# Patient Record
Sex: FEMALE | Race: OTHER
[De-identification: names, ages, dates, MRNs, and addresses within clinical notes are randomized per-mention and may not be internally consistent; named-entity substitution may affect disease eponyms.]

---

## 2018-06-25 PROBLEM — Z00.00 ENCOUNTER FOR PREVENTIVE HEALTH EXAMINATION: Status: ACTIVE | Noted: 2018-06-25

## 2018-06-29 ENCOUNTER — APPOINTMENT (OUTPATIENT)
Dept: ANTEPARTUM | Facility: CLINIC | Age: 33
End: 2018-06-29
Payer: COMMERCIAL

## 2018-06-29 ENCOUNTER — ASOB RESULT (OUTPATIENT)
Age: 33
End: 2018-06-29

## 2018-06-29 PROCEDURE — 76813 OB US NUCHAL MEAS 1 GEST: CPT

## 2018-06-29 PROCEDURE — 36416 COLLJ CAPILLARY BLOOD SPEC: CPT

## 2018-06-29 PROCEDURE — 76801 OB US < 14 WKS SINGLE FETUS: CPT

## 2018-07-16 ENCOUNTER — TRANSCRIPTION ENCOUNTER (OUTPATIENT)
Age: 33
End: 2018-07-16

## 2018-08-22 ENCOUNTER — OUTPATIENT (OUTPATIENT)
Dept: OUTPATIENT SERVICES | Facility: HOSPITAL | Age: 33
LOS: 1 days | End: 2018-08-22
Payer: COMMERCIAL

## 2018-08-22 DIAGNOSIS — O26.899 OTHER SPECIFIED PREGNANCY RELATED CONDITIONS, UNSPECIFIED TRIMESTER: ICD-10-CM

## 2018-08-22 DIAGNOSIS — Z3A.00 WEEKS OF GESTATION OF PREGNANCY NOT SPECIFIED: ICD-10-CM

## 2018-08-22 LAB
BASOPHILS # BLD AUTO: 0.02 K/UL — SIGNIFICANT CHANGE UP (ref 0–0.2)
BASOPHILS NFR BLD AUTO: 0.3 % — SIGNIFICANT CHANGE UP (ref 0–2)
BUN SERPL-MCNC: 10 MG/DL — SIGNIFICANT CHANGE UP (ref 7–23)
CALCIUM SERPL-MCNC: 9 MG/DL — SIGNIFICANT CHANGE UP (ref 8.4–10.5)
CHLORIDE SERPL-SCNC: 102 MMOL/L — SIGNIFICANT CHANGE UP (ref 98–107)
CO2 SERPL-SCNC: 22 MMOL/L — SIGNIFICANT CHANGE UP (ref 22–31)
CREAT SERPL-MCNC: 0.58 MG/DL — SIGNIFICANT CHANGE UP (ref 0.5–1.3)
EOSINOPHIL # BLD AUTO: 0.04 K/UL — SIGNIFICANT CHANGE UP (ref 0–0.5)
EOSINOPHIL NFR BLD AUTO: 0.6 % — SIGNIFICANT CHANGE UP (ref 0–6)
GLUCOSE BLDC GLUCOMTR-MCNC: 96 MG/DL — SIGNIFICANT CHANGE UP (ref 70–99)
GLUCOSE SERPL-MCNC: 87 MG/DL — SIGNIFICANT CHANGE UP (ref 70–99)
HCT VFR BLD CALC: 34.2 % — LOW (ref 34.5–45)
HGB BLD-MCNC: 11.8 G/DL — SIGNIFICANT CHANGE UP (ref 11.5–15.5)
IMM GRANULOCYTES # BLD AUTO: 0.04 # — SIGNIFICANT CHANGE UP
IMM GRANULOCYTES NFR BLD AUTO: 0.6 % — SIGNIFICANT CHANGE UP (ref 0–1.5)
LYMPHOCYTES # BLD AUTO: 1.19 K/UL — SIGNIFICANT CHANGE UP (ref 1–3.3)
LYMPHOCYTES # BLD AUTO: 16.4 % — SIGNIFICANT CHANGE UP (ref 13–44)
MCHC RBC-ENTMCNC: 31.2 PG — SIGNIFICANT CHANGE UP (ref 27–34)
MCHC RBC-ENTMCNC: 34.5 % — SIGNIFICANT CHANGE UP (ref 32–36)
MCV RBC AUTO: 90.5 FL — SIGNIFICANT CHANGE UP (ref 80–100)
MONOCYTES # BLD AUTO: 0.59 K/UL — SIGNIFICANT CHANGE UP (ref 0–0.9)
MONOCYTES NFR BLD AUTO: 8.1 % — SIGNIFICANT CHANGE UP (ref 2–14)
NEUTROPHILS # BLD AUTO: 5.37 K/UL — SIGNIFICANT CHANGE UP (ref 1.8–7.4)
NEUTROPHILS NFR BLD AUTO: 74 % — SIGNIFICANT CHANGE UP (ref 43–77)
NRBC # FLD: 0 — SIGNIFICANT CHANGE UP
PLATELET # BLD AUTO: 190 K/UL — SIGNIFICANT CHANGE UP (ref 150–400)
PMV BLD: 9.2 FL — SIGNIFICANT CHANGE UP (ref 7–13)
POTASSIUM SERPL-MCNC: 4.2 MMOL/L — SIGNIFICANT CHANGE UP (ref 3.5–5.3)
POTASSIUM SERPL-SCNC: 4.2 MMOL/L — SIGNIFICANT CHANGE UP (ref 3.5–5.3)
RBC # BLD: 3.78 M/UL — LOW (ref 3.8–5.2)
RBC # FLD: 12.4 % — SIGNIFICANT CHANGE UP (ref 10.3–14.5)
SODIUM SERPL-SCNC: 135 MMOL/L — SIGNIFICANT CHANGE UP (ref 135–145)
WBC # BLD: 7.25 K/UL — SIGNIFICANT CHANGE UP (ref 3.8–10.5)
WBC # FLD AUTO: 7.25 K/UL — SIGNIFICANT CHANGE UP (ref 3.8–10.5)

## 2018-08-22 PROCEDURE — 93010 ELECTROCARDIOGRAM REPORT: CPT

## 2018-08-24 ENCOUNTER — APPOINTMENT (OUTPATIENT)
Dept: ANTEPARTUM | Facility: CLINIC | Age: 33
End: 2018-08-24
Payer: COMMERCIAL

## 2018-08-24 ENCOUNTER — ASOB RESULT (OUTPATIENT)
Age: 33
End: 2018-08-24

## 2018-08-24 PROCEDURE — 76811 OB US DETAILED SNGL FETUS: CPT

## 2018-11-27 ENCOUNTER — ASOB RESULT (OUTPATIENT)
Age: 33
End: 2018-11-27

## 2018-11-27 ENCOUNTER — APPOINTMENT (OUTPATIENT)
Dept: ANTEPARTUM | Facility: CLINIC | Age: 33
End: 2018-11-27
Payer: COMMERCIAL

## 2018-11-27 ENCOUNTER — APPOINTMENT (OUTPATIENT)
Dept: ANTEPARTUM | Facility: HOSPITAL | Age: 33
End: 2018-11-27

## 2018-11-27 PROCEDURE — 76816 OB US FOLLOW-UP PER FETUS: CPT

## 2018-11-27 PROCEDURE — 76818 FETAL BIOPHYS PROFILE W/NST: CPT | Mod: 26

## 2018-11-27 PROCEDURE — 76820 UMBILICAL ARTERY ECHO: CPT

## 2018-12-07 ENCOUNTER — APPOINTMENT (OUTPATIENT)
Dept: ANTEPARTUM | Facility: CLINIC | Age: 33
End: 2018-12-07
Payer: COMMERCIAL

## 2018-12-07 ENCOUNTER — ASOB RESULT (OUTPATIENT)
Age: 33
End: 2018-12-07

## 2018-12-07 ENCOUNTER — EMERGENCY (EMERGENCY)
Facility: HOSPITAL | Age: 33
LOS: 1 days | Discharge: HOME | End: 2018-12-07
Attending: EMERGENCY MEDICINE | Admitting: EMERGENCY MEDICINE
Payer: COMMERCIAL

## 2018-12-07 ENCOUNTER — APPOINTMENT (OUTPATIENT)
Dept: ANTEPARTUM | Facility: HOSPITAL | Age: 33
End: 2018-12-07

## 2018-12-07 VITALS
SYSTOLIC BLOOD PRESSURE: 114 MMHG | HEART RATE: 74 BPM | OXYGEN SATURATION: 100 % | TEMPERATURE: 97 F | RESPIRATION RATE: 16 BRPM | DIASTOLIC BLOOD PRESSURE: 77 MMHG

## 2018-12-07 LAB
ALBUMIN SERPL ELPH-MCNC: 3.4 G/DL — SIGNIFICANT CHANGE UP (ref 3.3–5)
ALP SERPL-CCNC: 117 U/L — SIGNIFICANT CHANGE UP (ref 40–120)
ALT FLD-CCNC: 12 U/L — SIGNIFICANT CHANGE UP (ref 4–33)
AST SERPL-CCNC: 17 U/L — SIGNIFICANT CHANGE UP (ref 4–32)
BASE EXCESS BLDV CALC-SCNC: -1.9 MMOL/L — SIGNIFICANT CHANGE UP
BASOPHILS # BLD AUTO: 0.01 K/UL — SIGNIFICANT CHANGE UP (ref 0–0.2)
BASOPHILS NFR BLD AUTO: 0.1 % — SIGNIFICANT CHANGE UP (ref 0–2)
BILIRUB SERPL-MCNC: < 0.2 MG/DL — LOW (ref 0.2–1.2)
BLOOD GAS VENOUS - CREATININE: 0.46 MG/DL — LOW (ref 0.5–1.3)
BUN SERPL-MCNC: 7 MG/DL — SIGNIFICANT CHANGE UP (ref 7–23)
CALCIUM SERPL-MCNC: 9.1 MG/DL — SIGNIFICANT CHANGE UP (ref 8.4–10.5)
CHLORIDE BLDV-SCNC: 109 MMOL/L — HIGH (ref 96–108)
CHLORIDE SERPL-SCNC: 104 MMOL/L — SIGNIFICANT CHANGE UP (ref 98–107)
CO2 SERPL-SCNC: 23 MMOL/L — SIGNIFICANT CHANGE UP (ref 22–31)
CREAT SERPL-MCNC: 0.58 MG/DL — SIGNIFICANT CHANGE UP (ref 0.5–1.3)
EOSINOPHIL # BLD AUTO: 0.05 K/UL — SIGNIFICANT CHANGE UP (ref 0–0.5)
EOSINOPHIL NFR BLD AUTO: 0.6 % — SIGNIFICANT CHANGE UP (ref 0–6)
GAS PNL BLDV: 135 MMOL/L — LOW (ref 136–146)
GLUCOSE BLDV-MCNC: 100 — HIGH (ref 70–99)
GLUCOSE SERPL-MCNC: 94 MG/DL — SIGNIFICANT CHANGE UP (ref 70–99)
HCG SERPL-ACNC: SIGNIFICANT CHANGE UP MIU/ML
HCO3 BLDV-SCNC: 22 MMOL/L — SIGNIFICANT CHANGE UP (ref 20–27)
HCT VFR BLD CALC: 36 % — SIGNIFICANT CHANGE UP (ref 34.5–45)
HCT VFR BLDV CALC: 39.2 % — SIGNIFICANT CHANGE UP (ref 34.5–45)
HGB BLD-MCNC: 12.7 G/DL — SIGNIFICANT CHANGE UP (ref 11.5–15.5)
HGB BLDV-MCNC: 12.7 G/DL — SIGNIFICANT CHANGE UP (ref 11.5–15.5)
IMM GRANULOCYTES # BLD AUTO: 0.02 # — SIGNIFICANT CHANGE UP
IMM GRANULOCYTES NFR BLD AUTO: 0.3 % — SIGNIFICANT CHANGE UP (ref 0–1.5)
LACTATE BLDV-MCNC: 1.4 MMOL/L — SIGNIFICANT CHANGE UP (ref 0.5–2)
LYMPHOCYTES # BLD AUTO: 1.18 K/UL — SIGNIFICANT CHANGE UP (ref 1–3.3)
LYMPHOCYTES # BLD AUTO: 15.1 % — SIGNIFICANT CHANGE UP (ref 13–44)
MCHC RBC-ENTMCNC: 32.2 PG — SIGNIFICANT CHANGE UP (ref 27–34)
MCHC RBC-ENTMCNC: 35.3 % — SIGNIFICANT CHANGE UP (ref 32–36)
MCV RBC AUTO: 91.4 FL — SIGNIFICANT CHANGE UP (ref 80–100)
MONOCYTES # BLD AUTO: 0.49 K/UL — SIGNIFICANT CHANGE UP (ref 0–0.9)
MONOCYTES NFR BLD AUTO: 6.3 % — SIGNIFICANT CHANGE UP (ref 2–14)
NEUTROPHILS # BLD AUTO: 6.04 K/UL — SIGNIFICANT CHANGE UP (ref 1.8–7.4)
NEUTROPHILS NFR BLD AUTO: 77.6 % — HIGH (ref 43–77)
NRBC # FLD: 0 — SIGNIFICANT CHANGE UP
NT-PROBNP SERPL-SCNC: 56.05 PG/ML — SIGNIFICANT CHANGE UP
PCO2 BLDV: 38 MMHG — LOW (ref 41–51)
PH BLDV: 7.39 PH — SIGNIFICANT CHANGE UP (ref 7.32–7.43)
PLATELET # BLD AUTO: 220 K/UL — SIGNIFICANT CHANGE UP (ref 150–400)
PMV BLD: 9.5 FL — SIGNIFICANT CHANGE UP (ref 7–13)
PO2 BLDV: 42 MMHG — HIGH (ref 35–40)
POTASSIUM BLDV-SCNC: 3.7 MMOL/L — SIGNIFICANT CHANGE UP (ref 3.4–4.5)
POTASSIUM SERPL-MCNC: 4.2 MMOL/L — SIGNIFICANT CHANGE UP (ref 3.5–5.3)
POTASSIUM SERPL-SCNC: 4.2 MMOL/L — SIGNIFICANT CHANGE UP (ref 3.5–5.3)
PROT SERPL-MCNC: 6.7 G/DL — SIGNIFICANT CHANGE UP (ref 6–8.3)
RBC # BLD: 3.94 M/UL — SIGNIFICANT CHANGE UP (ref 3.8–5.2)
RBC # FLD: 12.9 % — SIGNIFICANT CHANGE UP (ref 10.3–14.5)
SAO2 % BLDV: 72.3 % — SIGNIFICANT CHANGE UP (ref 60–85)
SODIUM SERPL-SCNC: 138 MMOL/L — SIGNIFICANT CHANGE UP (ref 135–145)
TROPONIN T, HIGH SENSITIVITY: < 6 NG/L — SIGNIFICANT CHANGE UP (ref ?–14)
TSH SERPL-MCNC: 1.5 UIU/ML — SIGNIFICANT CHANGE UP (ref 0.27–4.2)
WBC # BLD: 7.79 K/UL — SIGNIFICANT CHANGE UP (ref 3.8–10.5)
WBC # FLD AUTO: 7.79 K/UL — SIGNIFICANT CHANGE UP (ref 3.8–10.5)

## 2018-12-07 PROCEDURE — 76818 FETAL BIOPHYS PROFILE W/NST: CPT | Mod: 26

## 2018-12-07 PROCEDURE — 93970 EXTREMITY STUDY: CPT | Mod: 26

## 2018-12-07 PROCEDURE — 93010 ELECTROCARDIOGRAM REPORT: CPT | Mod: 59

## 2018-12-07 PROCEDURE — 71045 X-RAY EXAM CHEST 1 VIEW: CPT | Mod: 26

## 2018-12-07 PROCEDURE — 76820 UMBILICAL ARTERY ECHO: CPT

## 2018-12-07 PROCEDURE — 99218: CPT | Mod: 25

## 2018-12-07 PROCEDURE — 71046 X-RAY EXAM CHEST 2 VIEWS: CPT | Mod: 26

## 2018-12-07 NOTE — ED CDU PROVIDER INITIAL DAY NOTE - PHYSICAL EXAMINATION
Vital signs reviewed.   CONSTITUTIONAL: Well-appearing; well-nourished; in no apparent distress. Non-toxic appearing.   HEAD: Normocephalic, atraumatic.  EYES: PERRL, EOM intact, conjunctiva and sclera WNL.  ENT: normal nose; no rhinorrhea;   NECK: Trachea midline, no midline tenderness noted.   CARD: Normal S1, S2; no murmurs, rubs, or gallops noted.  RESP: Normal chest excursion with respiration; breath sounds clear and equal bilaterally; no wheezes, rhonchi, or rales.  ABD/GI: soft, non-distended; non-tender  EXT/MS: moves all extremities; distal pulses are normal, no pedal edema. No midline tenderness.   SKIN: Normal for age and race; warm; dry; good turgor; no apparent lesions or exudate noted.  NEURO: Awake, alert, oriented x 3, no gross deficits  PSYCH: Normal mood; appropriate affect.

## 2018-12-07 NOTE — ED ADULT NURSE REASSESSMENT NOTE - NS ED NURSE REASSESS COMMENT FT1
Patient is awake, A&O x 3, NAD, presents to ED for increased LUCERO and chest pressure.  Cleared by OB and sent to ED for Eval.  Patient admitted to CDU for eval.  report given and will take patient to CDU.

## 2018-12-07 NOTE — ED PROVIDER NOTE - ATTENDING CONTRIBUTION TO CARE
ED Attending (Hao JONES): I have personally performed a face to face bedside history and physical examination of this patient. I have discussed the history, examination, assessment and plan of management with the Physician Assistant. My findings include: 32 y/o female  36 weeks pregnant c/o sob. Pt admits to feeling sob throughout her entire pregnancy. Pt admits that over the last 2-3 weeks the sob has increased and is now LUCERO. Pt admits to having to stop after walking 1 flight of stairs to catch her breath. Pt also admits to chest discomfort with exertion 2x over the past week. Pt admits to palpitations associated with the sob. Pt went to her normal OBGYN appointment today who then sent pt to the ER for eval. Pt denies diaphoresis, abd pain, n/v/d, vaginal bleeding, numbness, tingling, weakness, dizziness, syncope, fever or chills. Denies recent travel or sick contacts. Denies cough, sore throat or rhinorrhea. On exam: VSS in no distress, obese, lungs clear heart sounds normal, pulses normal, abdo obese, uterus c/w 36 week gestation, non tender, neuro normal, ext bilat edema, IMP: Worsening SOB LUCERO in 36 week pregnant female. Plan: Labs EKG, CXR dopplers, consider CTPA, will d/w OB.

## 2018-12-07 NOTE — ED ADULT NURSE REASSESSMENT NOTE - NS ED NURSE REASSESS COMMENT FT1
Break relief RN: pt returned from U/S   Pt alert and oriented x4, speaking full sentences no SOB noted   Pt states shes 36 week pregnant EDC 1/4 + fetal movement denies pain   awaiting dispo

## 2018-12-07 NOTE — ED CDU PROVIDER INITIAL DAY NOTE - OBJECTIVE STATEMENT
HPI: Pt is a 33 y.o female  currently 36 weeks pregnant w/ PMHx "heart murmur as child' who presents to ED sent by OB/GYN for c/o sob. As per patient states that she has felt sob throughout her entire pregnancy but over past 2 weeks sob has increased, admitting to feeling sob when walking maybe 20-30 ft. Also admits she feels she needs to catch her breath with ambulating up stairs. Occasionally gets CP when experiencing sob. Pt states she goes to routine NST weekly and while having NST today she noted to RN she felt sob, so OB recommended she go to ER. Pt also notes 60 lb weight gain with pregnancy and feeling tired. Denies n/v/d, abdominal pain, neck pain, back pain, pleuritic pain, palpitations, weakness, numbness or tingling, vaginal bleeding, discharge, dysuria, fevers, chills, trauma/falls or any other complaints. Denies pregnancy related sx.     OB/GYN- Dr. Swann  +Former smoker

## 2018-12-07 NOTE — ED CDU PROVIDER INITIAL DAY NOTE - ATTENDING CONTRIBUTION TO CARE
ED Attending (Hao JONES): I have personally performed a face to face bedside history and physical examination of this patient. I have discussed the history, examination, assessment and plan of management with the Physician Assistant. My findings include: 34 y/o female  36 weeks pregnant c/o sob. Pt admits to feeling sob throughout her entire pregnancy. Pt admits that over the last 2-3 weeks the sob has increased and is now LUCERO. Pt admits to having to stop after walking 1 flight of stairs to catch her breath. Pt also admits to chest discomfort with exertion 2x over the past week. Pt admits to palpitations associated with the sob. Pt went to her normal OBGYN appointment today who then sent pt to the ER for eval. Pt denies diaphoresis, abd pain, n/v/d, vaginal bleeding, numbness, tingling, weakness, dizziness, syncope, fever or chills. Denies recent travel or sick contacts. Denies cough, sore throat or rhinorrhea. On exam: VSS in no distress, obese, lungs clear heart sounds normal, pulses normal, abdo obese, uterus c/w 36 week gestation, non tender, neuro normal, ext bilat edema, IMP: Worsening SOB LUCERO in 36 week pregnant female. In ED case d/w OB (resident and L&D [id number]) pt had labs, EKG CXR duplex, all unremarkable. Pt refuses CTPA. Sent to CDU for monitoring and ECHO in AM

## 2018-12-07 NOTE — ED PROVIDER NOTE - PROGRESS NOTE DETAILS
Discussed with OB ext [id number] and OB resident. Patient initially refused CXR subsequently agreed CXR neg, labs unremarkable and Duplex LE neg. Pt still declines CTPA. Will admit to CDU for monitoring and Echo in AM

## 2018-12-07 NOTE — ED CDU PROVIDER INITIAL DAY NOTE - DETAILS
Pt is a 33 y.o female  w/ PMhx of "heart murmur as child" who presents to ED c/o sob. Pt seen and worked up in ED, trop neg, ECG stable, CXR normal, BNP normal, OB aware and will perform NST. Pt was transferred to CDU for; Echo, tele, vitals q4, frequent re-evals.

## 2018-12-07 NOTE — ED PROVIDER NOTE - OBJECTIVE STATEMENT
34 y/o female  36 weeks pregnant c/o sob. Pt admits to feeling sob throughout her entire pregnancy. Pt admits that over the last 2-3 weeks the sob has increased and is now LUCERO. Pt admits to having to stop after walking 1 flight of stairs to catch her breath. Pt also admits to chest discomfort with exertion 2x over the past week. Pt admits to palpitations associated with the sob. Pt went to her normal GYN appointment today who then sent pt to the ER for eval. Pt denies diaphoresis, abd pain, n/v/d, vaginal bleeding, numbness, tingling, weakness, dizziness, syncope, fever or chills. Denies recent travel or sick contacts. Denies cough, sore throat or rhinorrhea.

## 2018-12-07 NOTE — ED ADULT NURSE NOTE - OBJECTIVE STATEMENT
33 y female A+Ox3, 36 weeks pregnant, presents to the ER with the complaint of SOB. Pt states SOB has been occuring since the start of the pregnancy. Was here for a routine visit upstairs and told to come down to get checked. Pt states SOB starts when she ambulates for a while. Able to lay down flat. Denies any fever, chills, cough. Denies any PMH. 20 g iv placed on right ac, labs drawn and sent. Will continue to monitor.

## 2018-12-07 NOTE — ED ADULT TRIAGE NOTE - CHIEF COMPLAINT QUOTE
pt amb to triage from OB c/o SOB associated w/ palpitations x past few weeks, worse recently, 36 weeks preg, ÁNGEL 1/4/19, able to complete sentences, no cough noted on presentation

## 2018-12-08 VITALS
SYSTOLIC BLOOD PRESSURE: 107 MMHG | TEMPERATURE: 98 F | HEART RATE: 66 BPM | RESPIRATION RATE: 16 BRPM | DIASTOLIC BLOOD PRESSURE: 78 MMHG | OXYGEN SATURATION: 100 %

## 2018-12-08 PROCEDURE — 99217: CPT

## 2018-12-08 PROCEDURE — 93306 TTE W/DOPPLER COMPLETE: CPT | Mod: 26

## 2018-12-08 NOTE — ED CDU PROVIDER SUBSEQUENT DAY NOTE - NS ED MD PROGRESS NOTE ADD
Add Progress Note...
I have personally performed a face to face diagnostic evaluation on this patient. I have reviewed the ACP note and agree with the history, exam and plan of care, except as noted.

## 2018-12-08 NOTE — CONSULT NOTE ADULT - ASSESSMENT
A/P: 33y  at 36w1d presenting w increasing LUCERO  - CXR prelim wnl, f/u final read  - LE dopplers wnl  - pt for ECHO in AM  - no acute OB interventions at this time, NST/BPP reactive and   - will continue to follow  - appreciate ED work-up    D/w Dr. Rehan Corrales, PGY2

## 2018-12-08 NOTE — CONSULT NOTE ADULT - SUBJECTIVE AND OBJECTIVE BOX
R2 GYN CONSULT NOTE    33y  Last Menstrual Period mid-March, ÁNGEL=18 by first trimester ultrasound, at 36w1d presenting to the ED w worsening SOB in the past 2 weeks.  Pt states that in the past two weeks, has been increasingly harder to walk up stairs, and now can only walk up 1 flight before needing to rest.  States has sharp chest pain when breathing fast after walking.  Pt states has had LE swelling for the past 2 months.  Pt denies calf pain, denies fevers, chills, nausea, vomiting, urinary problems.    Denies contractions, vaginal bleeding, leakage of fluids, endorses good fetal movement.    PNC: uncomplicated thus far    OB/GYN HISTORY: 2008 D&C for eTOP at 4wks    GynHx: denies fibroids, cysts, STIs, abnl pap smears    Surgical History:    No significant past surgical history    Past Medical History:   No pertinent past medical history    Social: went through a "smoking phase" 2 years ago, lasted 1 year, had 1-2 cigarettes per day, no longer smokes, denies EtOH, drug use    Meds: PNV, DHA    Allergies: No Known Allergies    REVIEW OF SYSTEMS: see HPI, otherwise neg    OBJECTIVE FINDINGS:    Vital Signs Last 24 Hrs  T(C): 36.5 (08 Dec 2018 03:17), Max: 36.6 (07 Dec 2018 19:22)  T(F): 97.7 (08 Dec 2018 03:17), Max: 97.9 (07 Dec 2018 23:20)  HR: 73 (08 Dec 2018 03:17) (63 - 77)  BP: 103/62 (08 Dec 2018 03:17) (103/62 - 131/69)  BP(mean): 131 (07 Dec 2018 23:20) (131 - 131)  RR: 16 (08 Dec 2018 03:17) (15 - 17)  SpO2: 99% (08 Dec 2018 03:17) (99% - 100%)        PE:  Gen: Comfortable, NAD  CV: RRR  Pulm: CTAB  Abd: Soft, NT, gravid  Ext: Mild +1 edema non pitting, no tenderness or erythema bilaterally    LABS:                        12.7   7.79  )-----------( 220      ( 07 Dec 2018 17:17 )             36.0     12-07    138  |  104  |  7   ----------------------------<  94  4.2   |  23  |  0.58    Ca    9.1      07 Dec 2018 17:17    TPro  6.7  /  Alb  3.4  /  TBili  < 0.2<L>  /  DBili  x   /  AST  17  /  ALT  12  /  AlkPhos  117          RADIOLOGY & ADDITIONAL STUDIES:  < from: Xray Chest 1 View AP/PA (18 @ 21:04) >    ******PRELIMINARY REPORT******          EXAM:  XR CHEST AP OR PA 1V      PROCEDURE DATE:  Dec  7 2018     ******PRELIMINARY REPORT******          INTERPRETATION:  Clear Lungs    ******PRELIMINARY REPORT******          ARCHIE ADNIELS M.D., RADIOLOGY RESIDENT    < end of copied text >    < from: US Duplex Venous Lower Ext Complete, Bilateral (18 @ 18:54) >    EXAM:  US DPLX LWR EXT VEINS COMPL BI      PROCEDURE DATE:  Dec  7 2018     INTERPRETATION:  CLINICAL INFORMATION: Leg swelling. Pregnancy.    COMPARISON: None available.    TECHNIQUE: Duplex sonography of the BILATERAL LOWER extremities with  color and spectral Doppler, with and without compression.      FINDINGS:    There is normal compressibility of the bilateral common femoral, femoral   and popliteal veins. No calf vein thrombosis is detected.    Doppler examination shows normal spontaneous and phasic flow.    IMPRESSION:     No evidence of bilateral lower extremity deep venous thrombosis.    RONEY ROBINS M.D., RADIOLOGY RESIDENT  This document has been electronically signed.  KATRINA RHOADES M.D., ATTENDING RADIOLOGIST  This document has been electronically signed. Dec  7 2018  7:29PM      < end of copied text >

## 2018-12-08 NOTE — ED CDU PROVIDER SUBSEQUENT DAY NOTE - HISTORY
See CDU Initial note for full HPI: Pt is a 33 y.o female   36 weeks pregnant w/ PMhx of "heart murmur as child" who presents to ED c/o sob. Pt admits to sob throughout course of pregnancy but has been worse past 2 weeks. Pt had NST outpatient and mentioned SOB and her OB sent her to ED. Pt denies ob related complaints.  Pt seen and worked up in ED, trop neg, ECG stable, CXR normal, BNP normal, OB consulted, NST performed and normal. Pt was transferred to CDU for; Echo, tele, vitals q4, frequent re-evals, OB following.

## 2018-12-08 NOTE — ED CDU PROVIDER DISPOSITION NOTE - ATTENDING CONTRIBUTION TO CARE
I performed a face-to-face evaluation of the patient and performed a history and physical examination. I agree with the history and physical examination.    Avelina: 36 wks. preg., SOB. OB NST unremarkable. EKG and CXR unremarkable. BNP unremarkable. Doppler legs unremarkable. Echo performed. Pt. wants to leave. Stable for discharge. Will call w/ echo results: [phone].

## 2018-12-08 NOTE — ED CDU PROVIDER SUBSEQUENT DAY NOTE - PHYSICAL EXAMINATION
Vital signs reviewed.   CONSTITUTIONAL: Well-appearing; well-nourished; in no apparent distress. Non-toxic appearing.   HEAD: Normocephalic, atraumatic.  ENT: normal nose; no rhinorrhea  NECK:Trachea midline   RESP: NAD, [place]us[place] ox 100% on RA  EXT/MS: moves all extremities; distal pulses are normal, no pedal edema.  SKIN: Normal for age and race;  PSYCH: Normal mood; appropriate affect.

## 2018-12-08 NOTE — ED CDU PROVIDER DISPOSITION NOTE - NSFOLLOWUPINSTRUCTIONS_ED_ALL_ED_FT
Follow up with your primary care physician in 48-72 hours- bring copies of your results. Follow up with your OB/GYN as discussed. The results to your echocardiogram are still pending, you left your phone number to be contacted to follow up on results. Return to the Emergency Department for chest pain, difficulty breathing,  worsening or persistent symptoms OR ANY NEW OR CONCERNING SYMPTOMS.

## 2018-12-08 NOTE — ED CDU PROVIDER SUBSEQUENT DAY NOTE - MEDICAL DECISION MAKING DETAILS
Pt is a 33 y.o female   36 weeks pregnant w/ PMhx of "heart murmur as child" who presents to ED c/o sob. Pt admits to sob throughout course of pregnancy but has been worse past 2 weeks. Pt seen and worked up in ED, trop neg, ECG stable, CXR normal, BNP normal, OB consulted, NST performed and normal. Pt was transferred to CDU for; Echo, tele, vitals q4, frequent re-evals, OB following.

## 2018-12-08 NOTE — ED CDU PROVIDER SUBSEQUENT DAY NOTE - ATTENDING CONTRIBUTION TO CARE
I performed a face-to-face evaluation of the patient and performed a history and physical examination. I agree with the history and physical examination.    36 wks. preg., SOB. OB NST unremarkable. EKG and CXR unremarkable. BNP unremarkable. Doppler legs unremarkable. Pending echo.

## 2018-12-08 NOTE — ED ADULT NURSE REASSESSMENT NOTE - NS ED NURSE REASSESS COMMENT FT1
patient awake aaox3. came in with shortness of breath. currently 36 weeks pregnant. obgyn nurse at bedside and did though examinations. no abnormalities. currently breathing comfortably on room air. lung sounds clear b/l. telemonitor yields nsr. sob and palpitations with ambulation. reports carpel tunnel b/l wrist. more on the right wrist. denies new chest pain, weakness, dizziness, weakness, numbness, tingling, lightheadedness. no further issues. IV to right AC. site intact. saline lock. orientated to unit.

## 2018-12-08 NOTE — ED CDU PROVIDER DISPOSITION NOTE - CLINICAL COURSE
Avelina: 36 wks. preg., SOB. OB NST unremarkable. EKG and CXR unremarkable. BNP unremarkable. Doppler legs unremarkable. Echo performed. Pt. wants to leave. Stable for discharge. Will call w/ echo results: [phone].

## 2018-12-08 NOTE — ED CDU PROVIDER SUBSEQUENT DAY NOTE - PROGRESS NOTE DETAILS
Patient resting comfortably, no complaints at this time. Patient in NAD. NST was completed and normal, OB evaluated patient, no intervention from them at this time but will follow patient. Pt pending Echo in AM. Will continue with current CDU tx plan and monitor closely. received sign out this morning that patient was pending echo read. had normal NST with GYN. xray and duplex wnl. pt ambulating to the bathroom without issue. Pt feeling well this morning. NO complaints. Pt states she has to go home and cannot stay any longer. She had her echo, pending results. cannot stay for the read. left contact information to f.u results. stable, no acute distress. return precautions given.

## 2018-12-14 ENCOUNTER — OUTPATIENT (OUTPATIENT)
Dept: OUTPATIENT SERVICES | Facility: HOSPITAL | Age: 33
LOS: 1 days | End: 2018-12-14

## 2018-12-14 ENCOUNTER — APPOINTMENT (OUTPATIENT)
Dept: ANTEPARTUM | Facility: HOSPITAL | Age: 33
End: 2018-12-14

## 2018-12-14 ENCOUNTER — ASOB RESULT (OUTPATIENT)
Age: 33
End: 2018-12-14

## 2018-12-14 ENCOUNTER — APPOINTMENT (OUTPATIENT)
Dept: ANTEPARTUM | Facility: CLINIC | Age: 33
End: 2018-12-14
Payer: COMMERCIAL

## 2018-12-14 PROCEDURE — 76818 FETAL BIOPHYS PROFILE W/NST: CPT | Mod: 26

## 2018-12-21 ENCOUNTER — ASOB RESULT (OUTPATIENT)
Age: 33
End: 2018-12-21

## 2018-12-21 ENCOUNTER — APPOINTMENT (OUTPATIENT)
Dept: ANTEPARTUM | Facility: HOSPITAL | Age: 33
End: 2018-12-21

## 2018-12-21 ENCOUNTER — OUTPATIENT (OUTPATIENT)
Dept: OUTPATIENT SERVICES | Facility: HOSPITAL | Age: 33
LOS: 1 days | End: 2018-12-21

## 2018-12-21 ENCOUNTER — APPOINTMENT (OUTPATIENT)
Dept: ANTEPARTUM | Facility: CLINIC | Age: 33
End: 2018-12-21
Payer: COMMERCIAL

## 2018-12-21 PROCEDURE — 76818 FETAL BIOPHYS PROFILE W/NST: CPT | Mod: 26

## 2018-12-21 PROCEDURE — 76820 UMBILICAL ARTERY ECHO: CPT

## 2018-12-21 PROCEDURE — 76816 OB US FOLLOW-UP PER FETUS: CPT

## 2018-12-24 ENCOUNTER — APPOINTMENT (OUTPATIENT)
Dept: ANTEPARTUM | Facility: CLINIC | Age: 33
End: 2018-12-24
Payer: COMMERCIAL

## 2018-12-24 ENCOUNTER — ASOB RESULT (OUTPATIENT)
Age: 33
End: 2018-12-24

## 2018-12-24 ENCOUNTER — OUTPATIENT (OUTPATIENT)
Dept: OUTPATIENT SERVICES | Facility: HOSPITAL | Age: 33
LOS: 1 days | End: 2018-12-24

## 2018-12-24 ENCOUNTER — APPOINTMENT (OUTPATIENT)
Dept: ANTEPARTUM | Facility: HOSPITAL | Age: 33
End: 2018-12-24

## 2018-12-24 PROCEDURE — 76820 UMBILICAL ARTERY ECHO: CPT

## 2018-12-24 PROCEDURE — 76818 FETAL BIOPHYS PROFILE W/NST: CPT | Mod: 26

## 2018-12-26 ENCOUNTER — INPATIENT (INPATIENT)
Facility: HOSPITAL | Age: 33
LOS: 3 days | Discharge: HOME | End: 2018-12-30
Attending: OBSTETRICS & GYNECOLOGY | Admitting: OBSTETRICS & GYNECOLOGY
Payer: COMMERCIAL

## 2018-12-26 ENCOUNTER — OUTPATIENT (OUTPATIENT)
Dept: OUTPATIENT SERVICES | Facility: HOSPITAL | Age: 33
LOS: 1 days | End: 2018-12-26

## 2018-12-26 ENCOUNTER — TRANSCRIPTION ENCOUNTER (OUTPATIENT)
Age: 33
End: 2018-12-26

## 2018-12-26 VITALS — WEIGHT: 249.12 LBS | HEIGHT: 64 IN

## 2018-12-26 DIAGNOSIS — Z3A.00 WEEKS OF GESTATION OF PREGNANCY NOT SPECIFIED: ICD-10-CM

## 2018-12-26 DIAGNOSIS — O26.90 PREGNANCY RELATED CONDITIONS, UNSPECIFIED, UNSPECIFIED TRIMESTER: ICD-10-CM

## 2018-12-26 DIAGNOSIS — O26.899 OTHER SPECIFIED PREGNANCY RELATED CONDITIONS, UNSPECIFIED TRIMESTER: ICD-10-CM

## 2018-12-26 LAB
BASOPHILS # BLD AUTO: 0.01 K/UL — SIGNIFICANT CHANGE UP (ref 0–0.2)
BASOPHILS NFR BLD AUTO: 0.1 % — SIGNIFICANT CHANGE UP (ref 0–2)
BLD GP AB SCN SERPL QL: NEGATIVE — SIGNIFICANT CHANGE UP
EOSINOPHIL # BLD AUTO: 0.01 K/UL — SIGNIFICANT CHANGE UP (ref 0–0.5)
EOSINOPHIL NFR BLD AUTO: 0.1 % — SIGNIFICANT CHANGE UP (ref 0–6)
HCT VFR BLD CALC: 33.9 % — LOW (ref 34.5–45)
HGB BLD-MCNC: 12 G/DL — SIGNIFICANT CHANGE UP (ref 11.5–15.5)
IMM GRANULOCYTES # BLD AUTO: 0.04 # — SIGNIFICANT CHANGE UP
IMM GRANULOCYTES NFR BLD AUTO: 0.4 % — SIGNIFICANT CHANGE UP (ref 0–1.5)
LYMPHOCYTES # BLD AUTO: 0.92 K/UL — LOW (ref 1–3.3)
LYMPHOCYTES # BLD AUTO: 8.7 % — LOW (ref 13–44)
MCHC RBC-ENTMCNC: 31.8 PG — SIGNIFICANT CHANGE UP (ref 27–34)
MCHC RBC-ENTMCNC: 35.4 % — SIGNIFICANT CHANGE UP (ref 32–36)
MCV RBC AUTO: 89.9 FL — SIGNIFICANT CHANGE UP (ref 80–100)
MONOCYTES # BLD AUTO: 0.62 K/UL — SIGNIFICANT CHANGE UP (ref 0–0.9)
MONOCYTES NFR BLD AUTO: 5.9 % — SIGNIFICANT CHANGE UP (ref 2–14)
NEUTROPHILS # BLD AUTO: 8.94 K/UL — HIGH (ref 1.8–7.4)
NEUTROPHILS NFR BLD AUTO: 84.8 % — HIGH (ref 43–77)
NRBC # FLD: 0 — SIGNIFICANT CHANGE UP
PLATELET # BLD AUTO: 209 K/UL — SIGNIFICANT CHANGE UP (ref 150–400)
PMV BLD: 9.5 FL — SIGNIFICANT CHANGE UP (ref 7–13)
RBC # BLD: 3.77 M/UL — LOW (ref 3.8–5.2)
RBC # FLD: 12.9 % — SIGNIFICANT CHANGE UP (ref 10.3–14.5)
RH IG SCN BLD-IMP: POSITIVE — SIGNIFICANT CHANGE UP
RH IG SCN BLD-IMP: POSITIVE — SIGNIFICANT CHANGE UP
T PALLIDUM AB TITR SER: NEGATIVE — SIGNIFICANT CHANGE UP
WBC # BLD: 10.54 K/UL — HIGH (ref 3.8–10.5)
WBC # FLD AUTO: 10.54 K/UL — HIGH (ref 3.8–10.5)

## 2018-12-26 RX ORDER — SODIUM CHLORIDE 9 MG/ML
1000 INJECTION, SOLUTION INTRAVENOUS
Qty: 0 | Refills: 0 | Status: DISCONTINUED | OUTPATIENT
Start: 2018-12-26 | End: 2018-12-26

## 2018-12-26 RX ORDER — IBUPROFEN 200 MG
600 TABLET ORAL EVERY 6 HOURS
Qty: 0 | Refills: 0 | Status: DISCONTINUED | OUTPATIENT
Start: 2018-12-26 | End: 2018-12-26

## 2018-12-26 RX ORDER — OXYTOCIN 10 UNIT/ML
333.33 VIAL (ML) INJECTION
Qty: 20 | Refills: 0 | Status: DISCONTINUED | OUTPATIENT
Start: 2018-12-26 | End: 2018-12-27

## 2018-12-26 RX ORDER — OXYTOCIN 10 UNIT/ML
33.33 VIAL (ML) INJECTION
Qty: 20 | Refills: 0 | Status: DISCONTINUED | OUTPATIENT
Start: 2018-12-26 | End: 2018-12-26

## 2018-12-26 RX ORDER — OXYTOCIN 10 UNIT/ML
333.33 VIAL (ML) INJECTION
Qty: 20 | Refills: 0 | Status: COMPLETED | OUTPATIENT
Start: 2018-12-26

## 2018-12-26 RX ORDER — SODIUM CHLORIDE 9 MG/ML
1000 INJECTION, SOLUTION INTRAVENOUS ONCE
Qty: 0 | Refills: 0 | Status: COMPLETED | OUTPATIENT
Start: 2018-12-26 | End: 2018-12-26

## 2018-12-26 RX ORDER — ACETAMINOPHEN 500 MG
975 TABLET ORAL EVERY 6 HOURS
Qty: 0 | Refills: 0 | Status: DISCONTINUED | OUTPATIENT
Start: 2018-12-26 | End: 2018-12-26

## 2018-12-26 RX ORDER — OXYCODONE HYDROCHLORIDE 5 MG/1
5 TABLET ORAL
Qty: 0 | Refills: 0 | Status: DISCONTINUED | OUTPATIENT
Start: 2018-12-26 | End: 2018-12-26

## 2018-12-26 RX ORDER — SODIUM CHLORIDE 9 MG/ML
500 INJECTION, SOLUTION INTRAVENOUS ONCE
Qty: 0 | Refills: 0 | Status: DISCONTINUED | OUTPATIENT
Start: 2018-12-26 | End: 2018-12-26

## 2018-12-26 RX ORDER — OXYTOCIN 10 UNIT/ML
333.33 VIAL (ML) INJECTION
Qty: 20 | Refills: 0 | Status: DISCONTINUED | OUTPATIENT
Start: 2018-12-26 | End: 2018-12-26

## 2018-12-26 RX ORDER — SODIUM CHLORIDE 9 MG/ML
1000 INJECTION, SOLUTION INTRAVENOUS
Qty: 0 | Refills: 0 | Status: DISCONTINUED | OUTPATIENT
Start: 2018-12-26 | End: 2018-12-27

## 2018-12-26 RX ORDER — OXYCODONE HYDROCHLORIDE 5 MG/1
5 TABLET ORAL EVERY 4 HOURS
Qty: 0 | Refills: 0 | Status: DISCONTINUED | OUTPATIENT
Start: 2018-12-26 | End: 2018-12-26

## 2018-12-26 RX ADMIN — SODIUM CHLORIDE 250 MILLILITER(S): 9 INJECTION, SOLUTION INTRAVENOUS at 16:25

## 2018-12-26 RX ADMIN — SODIUM CHLORIDE 2000 MILLILITER(S): 9 INJECTION, SOLUTION INTRAVENOUS at 15:10

## 2018-12-26 NOTE — PATIENT PROFILE OB - COMPLETE THE FOLLOWING IF THE PATIENT REFUSES THE INFLUENZA VACCINE:
Bedside and Verbal shift change report given to Darrol Kocher, RN and ELIE Membreno (oncoming nurse) by Navneet Tirado RN (offgoing nurse). Report included the following information SBAR, Kardex, ED Summary, Intake/Output, MAR, Recent Results and Cardiac Rhythm SR w/1st degree AVB. Dual skin assessment reviewed. Pt lying quietly in bed, in NAD. Alert and oriented. Lung sounds lightly coarse/diminished. O2 Sat 97% on 6L HFNC; weaning as tolerates. Abdomen distended/rotund, semi-soft, nontender. Bowel sounds hypoactive. LBM Sat 4/8. No nausea or vomiting since admit. NPO. NGT in place to LIS. Voiding via urinal.  Repositioned in bed. Call light in reach. Risks/benefits discussed with patient/surrogate

## 2018-12-27 ENCOUNTER — APPOINTMENT (OUTPATIENT)
Dept: ANTEPARTUM | Facility: HOSPITAL | Age: 33
End: 2018-12-27

## 2018-12-27 ENCOUNTER — APPOINTMENT (OUTPATIENT)
Dept: ANTEPARTUM | Facility: CLINIC | Age: 33
End: 2018-12-27

## 2018-12-27 ENCOUNTER — RESULT REVIEW (OUTPATIENT)
Age: 33
End: 2018-12-27

## 2018-12-27 LAB
BASOPHILS # BLD AUTO: 0.02 K/UL — SIGNIFICANT CHANGE UP (ref 0–0.2)
BASOPHILS NFR BLD AUTO: 0.2 % — SIGNIFICANT CHANGE UP (ref 0–2)
EOSINOPHIL # BLD AUTO: 0 K/UL — SIGNIFICANT CHANGE UP (ref 0–0.5)
EOSINOPHIL NFR BLD AUTO: 0 % — SIGNIFICANT CHANGE UP (ref 0–6)
HCT VFR BLD CALC: 31 % — LOW (ref 34.5–45)
HGB BLD-MCNC: 10.8 G/DL — LOW (ref 11.5–15.5)
IMM GRANULOCYTES # BLD AUTO: 0.06 # — SIGNIFICANT CHANGE UP
IMM GRANULOCYTES NFR BLD AUTO: 0.5 % — SIGNIFICANT CHANGE UP (ref 0–1.5)
LYMPHOCYTES # BLD AUTO: 0.69 K/UL — LOW (ref 1–3.3)
LYMPHOCYTES # BLD AUTO: 5.5 % — LOW (ref 13–44)
MCHC RBC-ENTMCNC: 31.8 PG — SIGNIFICANT CHANGE UP (ref 27–34)
MCHC RBC-ENTMCNC: 34.8 % — SIGNIFICANT CHANGE UP (ref 32–36)
MCV RBC AUTO: 91.2 FL — SIGNIFICANT CHANGE UP (ref 80–100)
MONOCYTES # BLD AUTO: 0.52 K/UL — SIGNIFICANT CHANGE UP (ref 0–0.9)
MONOCYTES NFR BLD AUTO: 4.2 % — SIGNIFICANT CHANGE UP (ref 2–14)
NEUTROPHILS # BLD AUTO: 11.21 K/UL — HIGH (ref 1.8–7.4)
NEUTROPHILS NFR BLD AUTO: 89.6 % — HIGH (ref 43–77)
NRBC # FLD: 0 — SIGNIFICANT CHANGE UP
PLATELET # BLD AUTO: 188 K/UL — SIGNIFICANT CHANGE UP (ref 150–400)
PMV BLD: 9.6 FL — SIGNIFICANT CHANGE UP (ref 7–13)
RBC # BLD: 3.4 M/UL — LOW (ref 3.8–5.2)
RBC # FLD: 12.9 % — SIGNIFICANT CHANGE UP (ref 10.3–14.5)
WBC # BLD: 12.5 K/UL — HIGH (ref 3.8–10.5)
WBC # FLD AUTO: 12.5 K/UL — HIGH (ref 3.8–10.5)

## 2018-12-27 PROCEDURE — 88307 TISSUE EXAM BY PATHOLOGIST: CPT | Mod: 26

## 2018-12-27 RX ORDER — DIPHENHYDRAMINE HCL 50 MG
25 CAPSULE ORAL EVERY 6 HOURS
Qty: 0 | Refills: 0 | Status: DISCONTINUED | OUTPATIENT
Start: 2018-12-27 | End: 2018-12-30

## 2018-12-27 RX ORDER — SIMETHICONE 80 MG/1
80 TABLET, CHEWABLE ORAL EVERY 4 HOURS
Qty: 0 | Refills: 0 | Status: DISCONTINUED | OUTPATIENT
Start: 2018-12-27 | End: 2018-12-30

## 2018-12-27 RX ORDER — ONDANSETRON 8 MG/1
4 TABLET, FILM COATED ORAL EVERY 6 HOURS
Qty: 0 | Refills: 0 | Status: DISCONTINUED | OUTPATIENT
Start: 2018-12-27 | End: 2018-12-28

## 2018-12-27 RX ORDER — IBUPROFEN 200 MG
600 TABLET ORAL EVERY 6 HOURS
Qty: 0 | Refills: 0 | Status: COMPLETED | OUTPATIENT
Start: 2018-12-27 | End: 2019-11-25

## 2018-12-27 RX ORDER — NALOXONE HYDROCHLORIDE 4 MG/.1ML
0.1 SPRAY NASAL
Qty: 0 | Refills: 0 | Status: DISCONTINUED | OUTPATIENT
Start: 2018-12-27 | End: 2018-12-28

## 2018-12-27 RX ORDER — SODIUM CHLORIDE 9 MG/ML
1000 INJECTION INTRAMUSCULAR; INTRAVENOUS; SUBCUTANEOUS
Qty: 0 | Refills: 0 | Status: DISCONTINUED | OUTPATIENT
Start: 2018-12-27 | End: 2018-12-27

## 2018-12-27 RX ORDER — HYDROMORPHONE HYDROCHLORIDE 2 MG/ML
1 INJECTION INTRAMUSCULAR; INTRAVENOUS; SUBCUTANEOUS
Qty: 0 | Refills: 0 | Status: DISCONTINUED | OUTPATIENT
Start: 2018-12-27 | End: 2018-12-27

## 2018-12-27 RX ORDER — OXYCODONE HYDROCHLORIDE 5 MG/1
5 TABLET ORAL
Qty: 0 | Refills: 0 | Status: DISCONTINUED | OUTPATIENT
Start: 2018-12-27 | End: 2018-12-27

## 2018-12-27 RX ORDER — TETANUS TOXOID, REDUCED DIPHTHERIA TOXOID AND ACELLULAR PERTUSSIS VACCINE, ADSORBED 5; 2.5; 8; 8; 2.5 [IU]/.5ML; [IU]/.5ML; UG/.5ML; UG/.5ML; UG/.5ML
0.5 SUSPENSION INTRAMUSCULAR ONCE
Qty: 0 | Refills: 0 | Status: COMPLETED | OUTPATIENT
Start: 2018-12-27

## 2018-12-27 RX ORDER — SODIUM CHLORIDE 9 MG/ML
1000 INJECTION, SOLUTION INTRAVENOUS
Qty: 0 | Refills: 0 | Status: DISCONTINUED | OUTPATIENT
Start: 2018-12-27 | End: 2018-12-27

## 2018-12-27 RX ORDER — GLYCERIN ADULT
1 SUPPOSITORY, RECTAL RECTAL AT BEDTIME
Qty: 0 | Refills: 0 | Status: DISCONTINUED | OUTPATIENT
Start: 2018-12-27 | End: 2018-12-30

## 2018-12-27 RX ORDER — CEFAZOLIN SODIUM 1 G
2000 VIAL (EA) INJECTION EVERY 8 HOURS
Qty: 0 | Refills: 0 | Status: DISCONTINUED | OUTPATIENT
Start: 2018-12-27 | End: 2018-12-28

## 2018-12-27 RX ORDER — LANOLIN
1 OINTMENT (GRAM) TOPICAL
Qty: 0 | Refills: 0 | Status: DISCONTINUED | OUTPATIENT
Start: 2018-12-27 | End: 2018-12-30

## 2018-12-27 RX ORDER — OXYTOCIN 10 UNIT/ML
2 VIAL (ML) INJECTION
Qty: 30 | Refills: 0 | Status: DISCONTINUED | OUTPATIENT
Start: 2018-12-27 | End: 2018-12-27

## 2018-12-27 RX ORDER — FERROUS SULFATE 325(65) MG
325 TABLET ORAL DAILY
Qty: 0 | Refills: 0 | Status: DISCONTINUED | OUTPATIENT
Start: 2018-12-27 | End: 2018-12-30

## 2018-12-27 RX ORDER — BUTORPHANOL TARTRATE 2 MG/ML
0.25 INJECTION, SOLUTION INTRAMUSCULAR; INTRAVENOUS EVERY 6 HOURS
Qty: 0 | Refills: 0 | Status: DISCONTINUED | OUTPATIENT
Start: 2018-12-27 | End: 2018-12-28

## 2018-12-27 RX ORDER — OXYCODONE HYDROCHLORIDE 5 MG/1
5 TABLET ORAL EVERY 4 HOURS
Qty: 0 | Refills: 0 | Status: COMPLETED | OUTPATIENT
Start: 2018-12-27 | End: 2019-01-03

## 2018-12-27 RX ORDER — ACETAMINOPHEN 500 MG
975 TABLET ORAL EVERY 6 HOURS
Qty: 0 | Refills: 0 | Status: DISCONTINUED | OUTPATIENT
Start: 2018-12-27 | End: 2018-12-27

## 2018-12-27 RX ORDER — ACETAMINOPHEN 500 MG
975 TABLET ORAL EVERY 6 HOURS
Qty: 0 | Refills: 0 | Status: DISCONTINUED | OUTPATIENT
Start: 2018-12-27 | End: 2018-12-30

## 2018-12-27 RX ORDER — SODIUM CHLORIDE 9 MG/ML
300 INJECTION INTRAMUSCULAR; INTRAVENOUS; SUBCUTANEOUS ONCE
Qty: 0 | Refills: 0 | Status: COMPLETED | OUTPATIENT
Start: 2018-12-27 | End: 2018-12-27

## 2018-12-27 RX ORDER — DOCUSATE SODIUM 100 MG
100 CAPSULE ORAL
Qty: 0 | Refills: 0 | Status: DISCONTINUED | OUTPATIENT
Start: 2018-12-27 | End: 2018-12-30

## 2018-12-27 RX ORDER — SODIUM CHLORIDE 9 MG/ML
1000 INJECTION, SOLUTION INTRAVENOUS ONCE
Qty: 0 | Refills: 0 | Status: COMPLETED | OUTPATIENT
Start: 2018-12-27 | End: 2018-12-27

## 2018-12-27 RX ORDER — OXYTOCIN 10 UNIT/ML
41.67 VIAL (ML) INJECTION
Qty: 20 | Refills: 0 | Status: DISCONTINUED | OUTPATIENT
Start: 2018-12-27 | End: 2018-12-27

## 2018-12-27 RX ORDER — KETOROLAC TROMETHAMINE 30 MG/ML
30 SYRINGE (ML) INJECTION EVERY 6 HOURS
Qty: 0 | Refills: 0 | Status: DISCONTINUED | OUTPATIENT
Start: 2018-12-27 | End: 2018-12-28

## 2018-12-27 RX ORDER — HEPARIN SODIUM 5000 [USP'U]/ML
5000 INJECTION INTRAVENOUS; SUBCUTANEOUS EVERY 12 HOURS
Qty: 0 | Refills: 0 | Status: DISCONTINUED | OUTPATIENT
Start: 2018-12-27 | End: 2018-12-30

## 2018-12-27 RX ORDER — OXYCODONE HYDROCHLORIDE 5 MG/1
5 TABLET ORAL
Qty: 0 | Refills: 0 | Status: COMPLETED | OUTPATIENT
Start: 2018-12-27 | End: 2019-01-03

## 2018-12-27 RX ORDER — OXYCODONE HYDROCHLORIDE 5 MG/1
10 TABLET ORAL
Qty: 0 | Refills: 0 | Status: DISCONTINUED | OUTPATIENT
Start: 2018-12-27 | End: 2018-12-27

## 2018-12-27 RX ADMIN — Medication 30 MILLIGRAM(S): at 13:55

## 2018-12-27 RX ADMIN — Medication 100 MILLIGRAM(S): at 20:23

## 2018-12-27 RX ADMIN — HYDROMORPHONE HYDROCHLORIDE 1 MILLIGRAM(S): 2 INJECTION INTRAMUSCULAR; INTRAVENOUS; SUBCUTANEOUS at 10:14

## 2018-12-27 RX ADMIN — HYDROMORPHONE HYDROCHLORIDE 1 MILLIGRAM(S): 2 INJECTION INTRAMUSCULAR; INTRAVENOUS; SUBCUTANEOUS at 09:59

## 2018-12-27 RX ADMIN — SODIUM CHLORIDE 2000 MILLILITER(S): 9 INJECTION, SOLUTION INTRAVENOUS at 07:38

## 2018-12-27 RX ADMIN — Medication 325 MILLIGRAM(S): at 13:15

## 2018-12-27 RX ADMIN — ONDANSETRON 4 MILLIGRAM(S): 8 TABLET, FILM COATED ORAL at 13:03

## 2018-12-27 RX ADMIN — Medication 125 MILLIUNIT(S)/MIN: at 09:40

## 2018-12-27 RX ADMIN — SODIUM CHLORIDE 75 MILLILITER(S): 9 INJECTION, SOLUTION INTRAVENOUS at 09:39

## 2018-12-27 RX ADMIN — BUTORPHANOL TARTRATE 0.25 MILLIGRAM(S): 2 INJECTION, SOLUTION INTRAMUSCULAR; INTRAVENOUS at 10:40

## 2018-12-27 RX ADMIN — SODIUM CHLORIDE 600 MILLILITER(S): 9 INJECTION INTRAMUSCULAR; INTRAVENOUS; SUBCUTANEOUS at 05:35

## 2018-12-27 RX ADMIN — Medication 30 MILLIGRAM(S): at 21:00

## 2018-12-27 RX ADMIN — Medication 100 MILLIGRAM(S): at 13:15

## 2018-12-27 RX ADMIN — HEPARIN SODIUM 5000 UNIT(S): 5000 INJECTION INTRAVENOUS; SUBCUTANEOUS at 13:15

## 2018-12-27 RX ADMIN — Medication 30 MILLIGRAM(S): at 20:23

## 2018-12-27 RX ADMIN — Medication 0.25 MILLIGRAM(S): at 05:54

## 2018-12-27 RX ADMIN — Medication 2 MILLIUNIT(S)/MIN: at 02:06

## 2018-12-27 RX ADMIN — Medication 30 MILLIGRAM(S): at 13:25

## 2018-12-28 RX ORDER — OXYCODONE HYDROCHLORIDE 5 MG/1
5 TABLET ORAL
Qty: 0 | Refills: 0 | Status: DISCONTINUED | OUTPATIENT
Start: 2018-12-28 | End: 2018-12-30

## 2018-12-28 RX ORDER — IBUPROFEN 200 MG
600 TABLET ORAL EVERY 6 HOURS
Qty: 0 | Refills: 0 | Status: DISCONTINUED | OUTPATIENT
Start: 2018-12-28 | End: 2018-12-30

## 2018-12-28 RX ORDER — OXYCODONE HYDROCHLORIDE 5 MG/1
5 TABLET ORAL EVERY 4 HOURS
Qty: 0 | Refills: 0 | Status: DISCONTINUED | OUTPATIENT
Start: 2018-12-28 | End: 2018-12-30

## 2018-12-28 RX ADMIN — Medication 975 MILLIGRAM(S): at 22:11

## 2018-12-28 RX ADMIN — Medication 975 MILLIGRAM(S): at 06:22

## 2018-12-28 RX ADMIN — Medication 975 MILLIGRAM(S): at 14:27

## 2018-12-28 RX ADMIN — Medication 975 MILLIGRAM(S): at 00:33

## 2018-12-28 RX ADMIN — Medication 600 MILLIGRAM(S): at 14:27

## 2018-12-28 RX ADMIN — Medication 600 MILLIGRAM(S): at 15:00

## 2018-12-28 RX ADMIN — Medication 975 MILLIGRAM(S): at 01:00

## 2018-12-28 RX ADMIN — HEPARIN SODIUM 5000 UNIT(S): 5000 INJECTION INTRAVENOUS; SUBCUTANEOUS at 14:14

## 2018-12-28 RX ADMIN — Medication 600 MILLIGRAM(S): at 21:41

## 2018-12-28 RX ADMIN — HEPARIN SODIUM 5000 UNIT(S): 5000 INJECTION INTRAVENOUS; SUBCUTANEOUS at 00:34

## 2018-12-28 RX ADMIN — Medication 600 MILLIGRAM(S): at 22:11

## 2018-12-28 RX ADMIN — Medication 100 MILLIGRAM(S): at 13:45

## 2018-12-28 RX ADMIN — Medication 100 MILLIGRAM(S): at 05:00

## 2018-12-28 RX ADMIN — Medication 30 MILLIGRAM(S): at 06:22

## 2018-12-28 RX ADMIN — Medication 1 TABLET(S): at 14:27

## 2018-12-28 RX ADMIN — Medication 975 MILLIGRAM(S): at 21:41

## 2018-12-28 RX ADMIN — Medication 325 MILLIGRAM(S): at 14:27

## 2018-12-28 RX ADMIN — Medication 975 MILLIGRAM(S): at 15:00

## 2018-12-29 ENCOUNTER — TRANSCRIPTION ENCOUNTER (OUTPATIENT)
Age: 33
End: 2018-12-29

## 2018-12-29 RX ADMIN — Medication 600 MILLIGRAM(S): at 22:10

## 2018-12-29 RX ADMIN — Medication 325 MILLIGRAM(S): at 12:55

## 2018-12-29 RX ADMIN — HEPARIN SODIUM 5000 UNIT(S): 5000 INJECTION INTRAVENOUS; SUBCUTANEOUS at 16:00

## 2018-12-29 RX ADMIN — Medication 600 MILLIGRAM(S): at 09:29

## 2018-12-29 RX ADMIN — Medication 975 MILLIGRAM(S): at 14:40

## 2018-12-29 RX ADMIN — Medication 975 MILLIGRAM(S): at 09:29

## 2018-12-29 RX ADMIN — Medication 975 MILLIGRAM(S): at 08:30

## 2018-12-29 RX ADMIN — Medication 600 MILLIGRAM(S): at 15:15

## 2018-12-29 RX ADMIN — Medication 1 TABLET(S): at 12:54

## 2018-12-29 RX ADMIN — Medication 600 MILLIGRAM(S): at 08:30

## 2018-12-29 RX ADMIN — Medication 975 MILLIGRAM(S): at 22:10

## 2018-12-29 RX ADMIN — Medication 975 MILLIGRAM(S): at 22:40

## 2018-12-29 RX ADMIN — Medication 600 MILLIGRAM(S): at 03:00

## 2018-12-29 RX ADMIN — HEPARIN SODIUM 5000 UNIT(S): 5000 INJECTION INTRAVENOUS; SUBCUTANEOUS at 03:00

## 2018-12-29 RX ADMIN — Medication 600 MILLIGRAM(S): at 14:40

## 2018-12-29 RX ADMIN — Medication 975 MILLIGRAM(S): at 03:30

## 2018-12-29 RX ADMIN — Medication 975 MILLIGRAM(S): at 03:00

## 2018-12-29 RX ADMIN — Medication 600 MILLIGRAM(S): at 03:30

## 2018-12-29 RX ADMIN — Medication 600 MILLIGRAM(S): at 22:40

## 2018-12-29 RX ADMIN — Medication 975 MILLIGRAM(S): at 15:10

## 2018-12-29 NOTE — DISCHARGE NOTE OB - PATIENT PORTAL LINK FT
You can access the [facility] Patient Portal, offered by [facility], by registering with the following website: [email or web address]

## 2018-12-29 NOTE — DISCHARGE NOTE OB - CARE PROVIDER_API CALL
Cristal Snell), Gynecology Obstetrics  Gynecology  [address] [place] [address]  Phone: [phone]  Fax: [phone]

## 2018-12-29 NOTE — DISCHARGE NOTE OB - MATERIALS PROVIDED
[facility] [place] Screening Program/[place]  Immunization Record/Breastfeeding Log/Shaken Baby Prevention Handout/Birth Certificate Instructions/Breastfeeding Mother’s Support Group Information/Guide to Postpartum Care/[facility] Screen Program/Discharge Medication Information for Patients and Families Pocket Guide/Back To Sleep Handout/Breastfeeding Guide and Packet

## 2018-12-29 NOTE — DISCHARGE NOTE OB - CARE PLAN
Principal Discharge DX:	 delivery delivered  Goal:	recovery  Assessment and plan of treatment:	recovery

## 2018-12-30 VITALS
RESPIRATION RATE: 16 BRPM | OXYGEN SATURATION: 100 % | DIASTOLIC BLOOD PRESSURE: 60 MMHG | HEART RATE: 71 BPM | TEMPERATURE: 98 F | SYSTOLIC BLOOD PRESSURE: 112 MMHG

## 2018-12-30 RX ORDER — TETANUS TOXOID, REDUCED DIPHTHERIA TOXOID AND ACELLULAR PERTUSSIS VACCINE, ADSORBED 5; 2.5; 8; 8; 2.5 [IU]/.5ML; [IU]/.5ML; UG/.5ML; UG/.5ML; UG/.5ML
0.5 SUSPENSION INTRAMUSCULAR ONCE
Qty: 0 | Refills: 0 | Status: COMPLETED | OUTPATIENT
Start: 2018-12-30 | End: 2018-12-30

## 2018-12-30 RX ADMIN — Medication 325 MILLIGRAM(S): at 10:04

## 2018-12-30 RX ADMIN — Medication 600 MILLIGRAM(S): at 04:25

## 2018-12-30 RX ADMIN — SIMETHICONE 80 MILLIGRAM(S): 80 TABLET, CHEWABLE ORAL at 10:04

## 2018-12-30 RX ADMIN — Medication 100 MILLIGRAM(S): at 10:04

## 2018-12-30 RX ADMIN — Medication 600 MILLIGRAM(S): at 11:00

## 2018-12-30 RX ADMIN — Medication 600 MILLIGRAM(S): at 10:04

## 2018-12-30 RX ADMIN — Medication 975 MILLIGRAM(S): at 11:00

## 2018-12-30 RX ADMIN — Medication 975 MILLIGRAM(S): at 04:24

## 2018-12-30 RX ADMIN — Medication 600 MILLIGRAM(S): at 03:55

## 2018-12-30 RX ADMIN — Medication 975 MILLIGRAM(S): at 10:04

## 2018-12-30 RX ADMIN — TETANUS TOXOID, REDUCED DIPHTHERIA TOXOID AND ACELLULAR PERTUSSIS VACCINE, ADSORBED 0.5 MILLILITER(S): 5; 2.5; 8; 8; 2.5 SUSPENSION INTRAMUSCULAR at 13:18

## 2018-12-30 RX ADMIN — HEPARIN SODIUM 5000 UNIT(S): 5000 INJECTION INTRAVENOUS; SUBCUTANEOUS at 03:54

## 2018-12-30 RX ADMIN — Medication 975 MILLIGRAM(S): at 03:54

## 2018-12-30 RX ADMIN — Medication 1 TABLET(S): at 10:04

## 2018-12-30 NOTE — PROGRESS NOTE ADULT - PROBLEM SELECTOR PLAN 1
- Continue regular diet.  - Increase ambulation.  - Continue motrin, tylenol, oxycodone PRN for pain control.   -Discharge planning    Ashley Terry PGY-1
- Continue regular diet.  - Increase ambulation.  - Continue motrin, tylenol, oxycodone PRN for pain control  - F/u AM CBC    Melissa Dubose, PGY1  Pager #[phone]
- Continue regular diet.  - Increase ambulation.  - Continue motrin, tylenol, oxycodone PRN for pain control.     Ashley Terry PGY-1

## 2018-12-30 NOTE — PROGRESS NOTE ADULT - ASSESSMENT
A/P: 32yo POD#2 s/p LTCS.  Patient is stable and doing well post-operatively.
A/P: 34yo POD#1 s/p LTCS.  Patient is stable and doing well post-operatively.
A/P: 32yo POD#3 s/p LTCS.  Patient is stable and doing well post-operatively.

## 2018-12-30 NOTE — PROGRESS NOTE ADULT - SUBJECTIVE AND OBJECTIVE BOX
OB Progress Note:  Delivery, POD#1    S: 34yo POD#1 s/p LTCS . Her pain is well controlled. She is tolerating a regular diet and passing flatus. Denies N/V. Denies CP/SOB/lightheadedness/dizziness. She is ambulating without difficulty. Voiding spontanously.     O:   Vital Signs Last 24 Hrs  T(C): 37 (28 Dec 2018 06:56), Max: 37.2 (27 Dec 2018 09:30)  T(F): 98.6 (28 Dec 2018 06:56), Max: 99 (27 Dec 2018 09:30)  HR: 85 (28 Dec 2018 06:56) (58 - 87)  BP: 105/65 (28 Dec 2018 06:56) (105/65 - 139/70)  BP(mean): 79 (27 Dec 2018 09:30) (74 - 85)  RR: 17 (28 Dec 2018 06:56) (16 - 22)  SpO2: 100% (28 Dec 2018 06:56) (97% - 100%)    Labs:  Blood type: O Positive  Rubella IgG: RPR: Negative                          10.8<L>   12.50<H> >-----------< 188    ( 12-27 @ 15:10 )             31.0<L>                        12.0   10.54<H> >-----------< 209    ( 12-26 @ 14:20 )             33.9<L>    MEDICATIONS  (STANDING):  acetaminophen   Tablet .. 975 milliGRAM(s) Oral every 6 hours  ceFAZolin   IVPB 2000 milliGRAM(s) IV Intermittent every 8 hours  diphtheria/tetanus/pertussis (acellular) Vaccine (ADAcel) 0.5 milliLiter(s) IntraMuscular once  ferrous    sulfate 325 milliGRAM(s) Oral daily  heparin  Injectable 5000 Unit(s) SubCutaneous every 12 hours  ibuprofen  Tablet. 600 milliGRAM(s) Oral every 6 hours  oxyCODONE    IR 5 milliGRAM(s) Oral every 3 hours  prenatal multivitamin 1 Tablet(s) Oral daily    MEDICATIONS  (PRN):  butorphanol Injectable 0.25 milliGRAM(s) IV Push every 6 hours PRN Pruritus  diphenhydrAMINE 25 milliGRAM(s) Oral every 6 hours PRN Itching  docusate sodium 100 milliGRAM(s) Oral two times a day PRN Stool Softening  glycerin Suppository - Adult 1 Suppository(s) Rectal at bedtime PRN Constipation  HYDROmorphone  Injectable 1 milliGRAM(s) IV Push every 3 hours PRN Severe Pain (7 - 10)  lanolin Ointment 1 Application(s) Topical every 3 hours PRN Sore Nipples  naloxone Injectable 0.1 milliGRAM(s) IV Push every 3 minutes PRN For ANY of the following changes in patient status:  A. RR LESS THAN 10 breaths per minute, B. Oxygen saturation LESS THAN 90%, C. Sedation score of 6  ondansetron Injectable 4 milliGRAM(s) IV Push every 6 hours PRN Nausea  oxyCODONE    IR 5 milliGRAM(s) Oral every 3 hours PRN Mild Pain (1 - 3)  oxyCODONE    IR 10 milliGRAM(s) Oral every 3 hours PRN Moderate Pain (4 - 6)  oxyCODONE    IR 5 milliGRAM(s) Oral every 4 hours PRN Severe Pain (7 - 10)  simethicone 80 milliGRAM(s) Chew every 4 hours PRN Gas    PE:  General: NAD  Abdomen: Mildly distended, appropriately tender, incision c/d/i.  Extremities: No erythema, no pitting edema
OB Progress Note:  Delivery, POD#2    S: 32yo POD#2 s/p LTCS . Her pain is well controlled. She is tolerating a regular diet and passing flatus. Denies N/V. Denies CP/SOB/lightheadedness/dizziness.   She is ambulating without difficulty.   Voiding spontaneously.     O:   Vital Signs Last 24 Hrs  T(C): 36.8 (29 Dec 2018 05:45), Max: 37.1 (28 Dec 2018 21:18)  T(F): 98.2 (29 Dec 2018 05:45), Max: 98.8 (28 Dec 2018 21:18)  HR: 75 (29 Dec 2018 05:45) (61 - 75)  BP: 112/58 (29 Dec 2018 05:45) (105/60 - 117/51)  BP(mean): --  RR: 17 (29 Dec 2018 05:45) (17 - 18)  SpO2: 99% (29 Dec 2018 05:45) (99% - 100%)    PE:  General: NAD  Abdomen: Mildly distended, appropriately tender, incision c/d/i, fundus firm.  Extremities: NTTP, no erythema, 2+ edema bilaterally  Pelvic: Minimal lochia    Labs:  Blood type: O Positive  Rubella IgG: RPR: Negative                          10.8<L>   12.50<H> >-----------< 188    (  @ 15:10 )             31.0<L>                        12.0   10.54<H> >-----------< 209    (  @ 14:20 )             33.9<L>
Postop Day  1  s/p   C- Section    THERAPY:  [x  ] Spinal /epidural  morphine  was given in OR    Subjective: no major complaints    Pain scale score: at rest _3_, with activity _6_    Sedation Score:	  [x  ] Alert	    [  ] Drowsy        [  ] Arousable	[  ] Asleep	[  ] Unresponsive    Side Effects:	  [ x ] None	     [  ] Nausea        [  ] Pruritus        [  ] Weakness   [  ] Numbness      Objective: ambulates,  back non-tender, gross neuro exam normal    T(C): 37 (12-28-18 @ 06:56), Max: 37.2 (12-27-18 @ 09:30)  HR: 85 (12-28-18 @ 06:56) (58 - 85)  BP: 105/65 (12-28-18 @ 06:56) (105/65 - 139/70)  RR: 17 (12-28-18 @ 06:56) (16 - 21)  SpO2: 100% (12-28-18 @ 06:56) (97% - 100%)  Wt(kg): --    ASSESSMENT/ PLAN   [ x ] Continue PRN analgesics  [ x ]Documentation and Verification of current medications     acetaminophen   Tablet .. 975 milliGRAM(s) Oral every 6 hours  butorphanol Injectable 0.25 milliGRAM(s) IV Push every 6 hours PRN  ceFAZolin   IVPB 2000 milliGRAM(s) IV Intermittent every 8 hours  diphenhydrAMINE 25 milliGRAM(s) Oral every 6 hours PRN  diphtheria/tetanus/pertussis (acellular) Vaccine (ADAcel) 0.5 milliLiter(s) IntraMuscular once  docusate sodium 100 milliGRAM(s) Oral two times a day PRN  ferrous    sulfate 325 milliGRAM(s) Oral daily  glycerin Suppository - Adult 1 Suppository(s) Rectal at bedtime PRN  heparin  Injectable 5000 Unit(s) SubCutaneous every 12 hours  HYDROmorphone  Injectable 1 milliGRAM(s) IV Push every 3 hours PRN  ibuprofen  Tablet. 600 milliGRAM(s) Oral every 6 hours  lanolin Ointment 1 Application(s) Topical every 3 hours PRN  naloxone Injectable 0.1 milliGRAM(s) IV Push every 3 minutes PRN  ondansetron Injectable 4 milliGRAM(s) IV Push every 6 hours PRN  oxyCODONE    IR 5 milliGRAM(s) Oral every 3 hours PRN  oxyCODONE    IR 10 milliGRAM(s) Oral every 3 hours PRN  oxyCODONE    IR 5 milliGRAM(s) Oral every 3 hours  oxyCODONE    IR 5 milliGRAM(s) Oral every 4 hours PRN  prenatal multivitamin 1 Tablet(s) Oral daily  simethicone 80 milliGRAM(s) Chew every 4 hours PRN      Comments: No anesthesia related complications noticed
OB Progress Note:  Delivery, POD#3    S: 34yo POD#3 s/p LTCS . Her pain is well controlled. She is tolerating a regular diet and passing flatus. Denies N/V. Denies CP/SOB/lightheadedness/dizziness.   She is ambulating without difficulty.   Voiding spontaneously.     O:   Vital Signs Last 24 Hrs  T(C): 36.8 (30 Dec 2018 06:00), Max: 37.1 (29 Dec 2018 14:32)  T(F): 98.2 (30 Dec 2018 06:00), Max: 98.8 (29 Dec 2018 14:32)  HR: 71 (30 Dec 2018 06:00) (60 - 80)  BP: 112/60 (30 Dec 2018 06:00) (95/77 - 127/80)  BP(mean): --  RR: 16 (30 Dec 2018 06:00) (16 - 18)  SpO2: 100% (30 Dec 2018 06:00) (100% - 100%)      PE:  General: NAD  Abdomen: Mildly distended, appropriately tender, incision c/d/i, fundus firm.  Extremities: NTTP, no erythema, 2+ edema  Pelvic: Minimal lochia    Labs:  Blood type: O Positive  Rubella IgG: RPR: Negative                          10.8<L>   12.50<H> >-----------< 188    ( 12-27 @ 15:10 )             31.0<L>

## 2019-01-12 LAB — SURGICAL PATHOLOGY STUDY: SIGNIFICANT CHANGE UP

## 2019-01-14 DIAGNOSIS — O99.213 OBESITY COMPLICATING PREGNANCY, THIRD TRIMESTER: ICD-10-CM

## 2019-01-14 DIAGNOSIS — Z3A.37 37 WEEKS GESTATION OF PREGNANCY: ICD-10-CM

## 2019-01-15 DIAGNOSIS — O41.93X0 DISORDER OF AMNIOTIC FLUID AND MEMBRANES, UNSPECIFIED, THIRD TRIMESTER, NOT APPLICABLE OR UNSPECIFIED: ICD-10-CM

## 2019-01-15 DIAGNOSIS — O36.5930 MATERNAL CARE FOR OTHER KNOWN OR SUSPECTED POOR FETAL GROWTH, THIRD TRIMESTER, NOT APPLICABLE OR UNSPECIFIED: ICD-10-CM

## 2019-01-15 DIAGNOSIS — O99.213 OBESITY COMPLICATING PREGNANCY, THIRD TRIMESTER: ICD-10-CM

## 2019-03-13 ENCOUNTER — TRANSCRIPTION ENCOUNTER (OUTPATIENT)
Age: 34
End: 2019-03-13

## 2020-10-30 NOTE — LACTATION INITIAL EVALUATION - LACTATION INTERVENTIONS
Copper IUD placed in 2012.  Plan to stay in place until 2022   Worked with mom on latch and positioning.  Infant not sustaining latch.  Received formula.  Hand expression taught and mom able to get colostrum.  Double electric pump given and mom understands use. Care of pump discussed with parents.  Introduced nipple shield and instructed parents on correct use.  Teach back demonstrated.  Baby sustained suck with shield and colostrum present in shield as well . Discussed plan of triple feeding with parents and nurse.  Parents will attempt feeding with/without shield, pump and supplement with expressed milk/formula as needed.  Mom demonstrated correct pumping and formula feeding skills.  Formula prep and feeding handout given and discussed.  All questions and concerns addressed.  Assistance as needed./initiate dual electric pump routine/stimulate nutritive suck using/initiate skin to skin/initiate hand expression routine

## 2022-05-09 NOTE — PATIENT PROFILE OB - BABY A: DATE/TIME OF DELIVERY
Sudden numbness or weakness of the face, arm, or leg, especially on one side of the body. Confusion, trouble speaking or understanding. Trouble seeing in one or both eyes. Trouble walking, dizziness, loss of balance or coordination. Severe headache.
27-Dec-2018 06:16
